# Patient Record
Sex: FEMALE | Race: WHITE | NOT HISPANIC OR LATINO
[De-identification: names, ages, dates, MRNs, and addresses within clinical notes are randomized per-mention and may not be internally consistent; named-entity substitution may affect disease eponyms.]

---

## 2017-05-24 DIAGNOSIS — R79.89 LOW VITAMIN D LEVEL: ICD-10-CM

## 2017-05-24 PROCEDURE — 82306 VITAMIN D 25 HYDROXY: CPT | Performed by: FAMILY MEDICINE

## 2017-05-24 PROCEDURE — 36415 COLL VENOUS BLD VENIPUNCTURE: CPT | Performed by: FAMILY MEDICINE

## 2017-05-25 LAB — DEPRECATED CALCIDIOL+CALCIFEROL SERPL-MC: 37 UG/L (ref 20–75)

## 2019-11-08 ENCOUNTER — HEALTH MAINTENANCE LETTER (OUTPATIENT)
Age: 44
End: 2019-11-08

## 2020-12-06 ENCOUNTER — HEALTH MAINTENANCE LETTER (OUTPATIENT)
Age: 45
End: 2020-12-06

## 2021-03-08 ENCOUNTER — IMMUNIZATION (OUTPATIENT)
Dept: NURSING | Facility: CLINIC | Age: 46
End: 2021-03-08
Payer: COMMERCIAL

## 2021-03-08 PROCEDURE — 0031A PR COVID VAC JANSSEN AD26 0.5ML: CPT

## 2021-03-08 PROCEDURE — 91303 PR COVID VAC JANSSEN AD26 0.5ML: CPT

## 2021-07-31 ENCOUNTER — HEALTH MAINTENANCE LETTER (OUTPATIENT)
Age: 46
End: 2021-07-31

## 2021-09-25 ENCOUNTER — HEALTH MAINTENANCE LETTER (OUTPATIENT)
Age: 46
End: 2021-09-25

## 2022-01-15 ENCOUNTER — HEALTH MAINTENANCE LETTER (OUTPATIENT)
Age: 47
End: 2022-01-15

## 2022-04-20 ASSESSMENT — ANXIETY QUESTIONNAIRES
7. FEELING AFRAID AS IF SOMETHING AWFUL MIGHT HAPPEN: NOT AT ALL
7. FEELING AFRAID AS IF SOMETHING AWFUL MIGHT HAPPEN: NOT AT ALL
GAD7 TOTAL SCORE: 4
1. FEELING NERVOUS, ANXIOUS, OR ON EDGE: SEVERAL DAYS
3. WORRYING TOO MUCH ABOUT DIFFERENT THINGS: SEVERAL DAYS
2. NOT BEING ABLE TO STOP OR CONTROL WORRYING: SEVERAL DAYS
GAD7 TOTAL SCORE: 4
5. BEING SO RESTLESS THAT IT IS HARD TO SIT STILL: NOT AT ALL
4. TROUBLE RELAXING: NOT AT ALL
6. BECOMING EASILY ANNOYED OR IRRITABLE: SEVERAL DAYS

## 2022-04-20 ASSESSMENT — ENCOUNTER SYMPTOMS
POLYPHAGIA: 0
DEPRESSION: 1
BREAST PAIN: 1
DECREASED CONCENTRATION: 0
ALTERED TEMPERATURE REGULATION: 1
INCREASED ENERGY: 1
FEVER: 0
WEIGHT LOSS: 0
DECREASED APPETITE: 0
CHILLS: 1
HOT FLASHES: 1
NERVOUS/ANXIOUS: 1
POLYDIPSIA: 0
HALLUCINATIONS: 0
PANIC: 0
FATIGUE: 1
NIGHT SWEATS: 1
DECREASED LIBIDO: 0
INSOMNIA: 0
BREAST MASS: 1
WEIGHT GAIN: 1

## 2022-04-21 ENCOUNTER — LAB (OUTPATIENT)
Dept: LAB | Facility: CLINIC | Age: 47
End: 2022-04-21
Attending: OBSTETRICS & GYNECOLOGY
Payer: COMMERCIAL

## 2022-04-21 ENCOUNTER — OFFICE VISIT (OUTPATIENT)
Dept: OBGYN | Facility: CLINIC | Age: 47
End: 2022-04-21
Attending: OBSTETRICS & GYNECOLOGY
Payer: COMMERCIAL

## 2022-04-21 VITALS
HEART RATE: 84 BPM | SYSTOLIC BLOOD PRESSURE: 112 MMHG | HEIGHT: 63 IN | BODY MASS INDEX: 25.87 KG/M2 | DIASTOLIC BLOOD PRESSURE: 79 MMHG | WEIGHT: 146 LBS

## 2022-04-21 DIAGNOSIS — Z13.1 SCREENING FOR DIABETES MELLITUS: ICD-10-CM

## 2022-04-21 DIAGNOSIS — Z13.21 ENCOUNTER FOR VITAMIN DEFICIENCY SCREENING: ICD-10-CM

## 2022-04-21 DIAGNOSIS — Z13.220 SCREENING FOR LIPOID DISORDERS: ICD-10-CM

## 2022-04-21 DIAGNOSIS — Z12.11 SCREEN FOR COLON CANCER: ICD-10-CM

## 2022-04-21 DIAGNOSIS — Z01.419 ENCOUNTER FOR GYNECOLOGICAL EXAMINATION WITHOUT ABNORMAL FINDING: ICD-10-CM

## 2022-04-21 DIAGNOSIS — R79.89 LOW VITAMIN D LEVEL: ICD-10-CM

## 2022-04-21 DIAGNOSIS — Z13.29 SCREENING FOR THYROID DISORDER: ICD-10-CM

## 2022-04-21 DIAGNOSIS — Z12.31 ENCOUNTER FOR SCREENING MAMMOGRAM FOR MALIGNANT NEOPLASM OF BREAST: ICD-10-CM

## 2022-04-21 DIAGNOSIS — N95.1 MENOPAUSAL SYNDROME (HOT FLASHES): ICD-10-CM

## 2022-04-21 DIAGNOSIS — Z00.00 VISIT FOR PREVENTIVE HEALTH EXAMINATION: Primary | ICD-10-CM

## 2022-04-21 DIAGNOSIS — Z12.4 SCREENING FOR MALIGNANT NEOPLASM OF CERVIX: ICD-10-CM

## 2022-04-21 LAB
CHOLEST SERPL-MCNC: 160 MG/DL
DEPRECATED CALCIDIOL+CALCIFEROL SERPL-MC: 33 UG/L (ref 20–75)
FASTING STATUS PATIENT QL REPORTED: NO
HBA1C MFR BLD: 4.8 % (ref 0–5.6)
HDLC SERPL-MCNC: 64 MG/DL
LDLC SERPL CALC-MCNC: 65 MG/DL
NONHDLC SERPL-MCNC: 96 MG/DL
TRIGL SERPL-MCNC: 154 MG/DL

## 2022-04-21 PROCEDURE — 82306 VITAMIN D 25 HYDROXY: CPT

## 2022-04-21 PROCEDURE — 90715 TDAP VACCINE 7 YRS/> IM: CPT

## 2022-04-21 PROCEDURE — 36415 COLL VENOUS BLD VENIPUNCTURE: CPT

## 2022-04-21 PROCEDURE — 250N000011 HC RX IP 250 OP 636

## 2022-04-21 PROCEDURE — 83036 HEMOGLOBIN GLYCOSYLATED A1C: CPT

## 2022-04-21 PROCEDURE — 99386 PREV VISIT NEW AGE 40-64: CPT | Performed by: OBSTETRICS & GYNECOLOGY

## 2022-04-21 PROCEDURE — G0145 SCR C/V CYTO,THINLAYER,RESCR: HCPCS | Performed by: OBSTETRICS & GYNECOLOGY

## 2022-04-21 PROCEDURE — 99213 OFFICE O/P EST LOW 20 MIN: CPT | Mod: 25 | Performed by: OBSTETRICS & GYNECOLOGY

## 2022-04-21 PROCEDURE — 80061 LIPID PANEL: CPT

## 2022-04-21 PROCEDURE — 90471 IMMUNIZATION ADMIN: CPT

## 2022-04-21 PROCEDURE — 87624 HPV HI-RISK TYP POOLED RSLT: CPT | Performed by: OBSTETRICS & GYNECOLOGY

## 2022-04-21 ASSESSMENT — ENCOUNTER SYMPTOMS
NECK MASS: 0
BRUISES/BLEEDS EASILY: 0
WEIGHT LOSS: 0
NERVOUS/ANXIOUS: 1
SEIZURES: 0
HEMATURIA: 0
POLYPHAGIA: 0
BOWEL INCONTINENCE: 0
MUSCLE WEAKNESS: 0
POSTURAL DYSPNEA: 0
BREAST PAIN: 1
COUGH DISTURBING SLEEP: 0
NECK PAIN: 0
FLANK PAIN: 0
SINUS PAIN: 0
MEMORY LOSS: 0
DEPRESSION: 1
ALTERED TEMPERATURE REGULATION: 1
LOSS OF CONSCIOUSNESS: 0
HALLUCINATIONS: 0
RECTAL PAIN: 0
RESPIRATORY PAIN: 0
CHILLS: 1
HOARSE VOICE: 0
ORTHOPNEA: 0
DIARRHEA: 0
BLOOD IN STOOL: 0
HEARTBURN: 0
SWOLLEN GLANDS: 0
SPUTUM PRODUCTION: 0
TROUBLE SWALLOWING: 0
DYSURIA: 0
JOINT SWELLING: 0
TASTE DISTURBANCE: 0
HEMOPTYSIS: 0
TACHYCARDIA: 0
POOR WOUND HEALING: 0
EYE REDNESS: 0
CLAUDICATION: 0
INCREASED ENERGY: 1
NAUSEA: 0
SYNCOPE: 0
DISTURBANCES IN COORDINATION: 0
COUGH: 0
STIFFNESS: 0
RECTAL BLEEDING: 0
DOUBLE VISION: 0
BLOATING: 0
DIZZINESS: 0
FATIGUE: 1
POLYDIPSIA: 0
CONSTIPATION: 0
FEVER: 0
DECREASED LIBIDO: 0
NAIL CHANGES: 0
LIGHT-HEADEDNESS: 0
EYE IRRITATION: 0
TREMORS: 0
WEAKNESS: 0
SINUS CONGESTION: 0
DIFFICULTY URINATING: 0
NUMBNESS: 0
MUSCLE CRAMPS: 0
ABDOMINAL PAIN: 0
SKIN CHANGES: 0
EXERCISE INTOLERANCE: 0
SNORES LOUDLY: 0
PANIC: 0
HYPERTENSION: 0
TINGLING: 0
DECREASED APPETITE: 0
SPEECH CHANGE: 0
MYALGIAS: 0
EYE PAIN: 0
DYSPNEA ON EXERTION: 0
SHORTNESS OF BREATH: 0
EXTREMITY NUMBNESS: 0
HYPOTENSION: 0
PALPITATIONS: 0
DECREASED CONCENTRATION: 0
BACK PAIN: 0
SORE THROAT: 0
INSOMNIA: 0
SLEEP DISTURBANCES DUE TO BREATHING: 0
WHEEZING: 0
VOMITING: 0
JAUNDICE: 0
SMELL DISTURBANCE: 0
PARALYSIS: 0
EYE WATERING: 0
HOT FLASHES: 1
BREAST MASS: 1
HEADACHES: 0
LEG SWELLING: 0
ARTHRALGIAS: 0
NIGHT SWEATS: 1
LEG PAIN: 0
WEIGHT GAIN: 1

## 2022-04-21 ASSESSMENT — ANXIETY QUESTIONNAIRES: GAD7 TOTAL SCORE: 4

## 2022-04-21 NOTE — PROGRESS NOTES
"  Progress Note    SUBJECTIVE:  Jose Maria Miles is an 46 year old, , who requests an Annual Preventive Exam.     Concerns today include: having more hormonal changes and she feels like more frequent \"ups and downs.\" She feels tired, irritated and weepy all the time. Last period had really bad cramps on . She does have some concerns that this might be caused by vitamin D deficiency. She does report that at baseline she has always had problems with periods with moodiness starting about 3 days before period starts. She does acupuncture but it has not been helping as much. Periods are shorter cycles now every 25 days. Before it was every 30 days. Still regularly having periods. Amount of bleedings still the same. Duration about 4-5 days so shorter than before. Problems have been going on for about 3 months. She is sleeping okay. Usually would sleep 8 hours. Is being woken up by bad dreams which occurs half the days of the week. No problem falling back asleep. No naps during the day. Takes a nap once a week an feels more energized. No recent stressors in life. Regarding her night sweats - they have been occurring more often now. Previously would have night sweats one night around her period but now it is more.    On ROS, pt endorses - chills, weight gain, fatigue, night sweats, menstrual changes, hot flashes, depression, mood swings, altered temperature regulation. She also says that she has a breast mass which waxes and wanes with her menstrual cycle.      Answers for HPI/ROS submitted by the patient on 2022  SAMANTHA 7 TOTAL SCORE: 4    Menstrual History:  Menstrual History 2016   LAST MENSTRUAL PERIOD 2016 - 2022   Menarche Age - 12 -   Period Cycle (Days) - every 30 days -   Period Duration (Days) - 5-7 days -   Method of Contraception - Condoms -   Period Pattern - Regular -   Menstrual Flow - Moderate -   Menstrual Control - Tampon -   Dysmenorrhea - None - "   Reviewed Today - Yes -       Last    Lab Results   Component Value Date    PAP NIL 2016     History of abnormal Pap smear: NO - age 30- 65 PAP every 3 years recommended    Last   Lab Results   Component Value Date    HPV16 Negative 2016     Last   Lab Results   Component Value Date    HPV18 Negative 2016     Last   Lab Results   Component Value Date    HRHPV Negative 2016       Mammogram current: not applicable  Last Mammogram: Never had a mamogram  No results found.     Last Colonoscopy: none  No results found for this or any previous visit.      HISTORY:  No current outpatient medications on file prior to visit.  No current facility-administered medications on file prior to visit.    Allergies   Allergen Reactions     Naproxen Nausea and Vomiting     Immunization History   Administered Date(s) Administered     COVID-19,PF,Kj 2021     COVID-19,PF,Moderna 2021     Influenza Vaccine IM > 6 months Valent IIV4 (Alfuria,Fluzone) 2016       OB History    Para Term  AB Living   1 1 1 0 0 1   SAB IAB Ectopic Multiple Live Births   0 0 0 0 1   Obstetric Comments   Son with Downs Syndrome     Past Medical History:   Diagnosis Date     Facial basal cell cancer      Infertility, female     Took 4 years to become pregnant.     Melanoma of face (H)     excision     Past Surgical History:   Procedure Laterality Date     BIOPSY      Many over time.     COSMETIC SURGERY      Repair for basal cell and melanoma on my face.     melanoma  2013    left cheek     MOHS MICROGRAPHIC PROCEDURE      multiple basal cell      Family History   Problem Relation Age of Onset     Other Cancer Maternal Grandfather         Lung Cancer - Heavy smoker     Other Cancer Maternal Aunt         lung ca (smoker)     Other Cancer Maternal Grandmother         throat ca (smoker)     Coronary Artery Disease Father         Bi-pass surgery     Hypertension Mother         High blood pressure,  recently hospitalized     Other Cancer Mother         melanoma, basal cell skin cancer     Coronary Artery Disease Paternal Grandfather 50         of MI     Lung Cancer Maternal Aunt      Osteoporosis Paternal Grandmother      Anxiety Disorder Sister      Mental Illness Other         Aunt, severe mental illness     Breast Cancer No family hx of         father's cousin, no ovarian cancer     Diabetes No family hx of      Hyperlipidemia No family hx of      Cerebrovascular Disease No family hx of      Colon Cancer No family hx of      Prostate Cancer No family hx of      Social History     Socioeconomic History     Marital status:      Spouse name: None     Number of children: None     Years of education: None     Highest education level: None   Tobacco Use     Smoking status: Never Smoker     Smokeless tobacco: Never Used   Substance and Sexual Activity     Alcohol use: Yes     Comment: 1-2 drinks per week     Drug use: No     Sexual activity: Yes     Partners: Male     Birth control/protection: Condom       Review of Systems     Constitutional:  Positive for chills, weight gain, fatigue, night sweats and increased energy. Negative for fever, weight loss, decreased appetite, recent stressors, height loss, post-operative complications, incisional pain, hallucinations, hyperactivity and confused.   HENT:  Negative for ear pain, hearing loss, tinnitus, nosebleeds, trouble swallowing, hoarse voice, mouth sores, sore throat, ear discharge, tooth pain, gum tenderness, taste disturbance, smell disturbance, hearing aid, bleeding gums, dry mouth, sinus pain, sinus congestion and neck mass.    Eyes:  Negative for double vision, pain, redness, eye pain, decreased vision, eye watering, eye bulging, eye dryness, flashing lights, spots, floaters, strabismus, tunnel vision, jaundice and eye irritation.   Respiratory:   Negative for cough, hemoptysis, sputum production, shortness of breath, wheezing, sleep disturbances  due to breathing, snores loudly, respiratory pain, dyspnea on exertion, cough disturbing sleep and postural dyspnea.    Cardiovascular:  Negative for chest pain, dyspnea on exertion, palpitations, orthopnea, claudication, leg swelling, fingers/toes turn blue, hypertension, hypotension, syncope, history of heart murmur, chest pain on exertion, chest pain at rest, pacemaker, few scattered varicosities, leg pain, sleep disturbances due to breathing, tachycardia, light-headedness, exercise intolerance and edema.   Gastrointestinal:  Negative for heartburn, nausea, vomiting, abdominal pain, diarrhea, constipation, blood in stool, melena, rectal pain, bloating, hemorrhoids, bowel incontinence, jaundice, rectal bleeding, coffee ground emesis and change in stool.   Genitourinary:  Positive for excessive menstruation, menstrual changes and hot flashes. Negative for bladder incontinence, dysuria, urgency, hematuria, flank pain, vaginal discharge, difficulty urinating, genital sores, dyspareunia, decreased libido, nocturia, voiding less frequently, arousal difficulty, abnormal vaginal bleeding, vaginal dryness and postmenopausal bleeding.   Musculoskeletal:  Negative for myalgias, back pain, joint swelling, arthralgias, stiffness, muscle cramps, neck pain, bone pain, muscle weakness and fracture.   Skin:  Negative for nail changes, itching, poor wound healing, rash, hair changes, skin changes, acne, warts, poor wound healing, scarring, flaky skin, Raynaud's phenomenon, sensitivity to sunlight and skin thickening.   Neurological:  Negative for dizziness, tingling, tremors, speech change, seizures, loss of consciousness, weakness, light-headedness, numbness, headaches, disturbances in coordination, extremity numbness, memory loss, difficulty walking and paralysis.   Endo/Heme:  Negative for anemia, swollen glands and bruises/bleeds easily.   Psychiatric/Behavioral:  Positive for depression and mood swings. Negative for  "hallucinations, memory loss, decreased concentration and panic attacks.    Breast:  Positive for breast mass and breast pain. Negative for breast discharge and nipple retraction.   Endocrine:  Positive for altered temperature regulation.Negative for polyphagia, polydipsia, unwanted hair growth and change in facial hair.    No flowsheet data found.  SAMANTHA-7 SCORE 12/1/2016 4/20/2022   Total Score 2 (minimal anxiety) 4 (minimal anxiety)   Total Score - 4         EXAM:  Blood pressure 112/79, pulse 84, height 1.588 m (5' 2.5\"), weight 66.2 kg (146 lb), last menstrual period 04/06/2022, not currently breastfeeding. Body mass index is 26.28 kg/m .  General - pleasant female in no acute distress.  Skin - no suspicious lesions or rashes  EENT-  PERRLA, euthyroid with out palpable nodules  Neck - supple without lymphadenopathy.  Lungs - clear to auscultation bilaterally.  Heart - regular rate and rhythm without murmur.  Abdomen - soft, nontender, nondistended, no masses or organomegaly noted.  Musculoskeletal - no gross deformities.  Neurological - normal strength, sensation, and mental status.    Breast Exam:  Breast: Without visible skin changes. No dimpling or lesions seen.   Breasts supple, non-tender with palpation, no dominant mass, nodularity, or nipple discharge noted bilaterally. Axillary nodes negative.  Patient also unable to palpate any masses today.    Pelvic Exam:  EG/BUS: Normal genital architecture without lesions, erythema or abnormal secretions Bartholin's, Urethra, Omar's normal   Urethral meatus: normal  Urethra: no masses, tenderness, or scarring  Bladder: no masses or tenderness  Vagina: moist, pink, rugae with physiologic discharge  secretions  Cervix: Multiparous,  Uterus: anteverted,  and small, smooth, firm, mobile w/o pain  Adnexa: Within normal limits and No masses, nodularity, tenderness  Rectum:anus normal       ASSESSMENT:  Encounter Diagnoses   Name Primary?     Visit for preventive health " examination Yes     Low vitamin D level      Screening for diabetes mellitus      Screening for lipoid disorders      Encounter for vitamin deficiency screening      Encounter for screening mammogram for malignant neoplasm of breast      Screening for malignant neoplasm of cervix      Encounter for gynecological examination without abnormal finding      Screen for colon cancer      Menopausal syndrome (hot flashes)      Screening for thyroid disorder       Jose Maria is a 45 y/o  with a history of melanoma, PMS, primary infertility, dysmenorrhea who presents with 3-6 months of night sweats, fatigue, weepiness, irritability. Differential includes perimenopause vs depression.    PLAN:   Orders Placed This Encounter   Procedures     Pelvic and Breast Exam Procedure []     Pap Smear Exam [] Do Not Remove     Mammogram Screening Bilateral Digital [LOC999]     TDAP VACCINE (Adacel, Boostrix)  [[id number]]     Hemoglobin A1c [LAB90]     Lipid panel reflex to direct LDL Non-fasting     25- [place]-Vitamin D     TSH with free T4 reflex     HPV High Risk Types DNA Cervical     Adult Gastro Ref - Procedure Only     No orders of the defined types were placed in this encounter.    #Perimenopause  - TSH, estradiol levels pending  - referral to therapist  - f/u with telemedicine visit to discuss labs and   - offered option to start SSRIs  labs return abnormal    #Preventative Screening  - Lipids panel, A1C, Vitamin D levels pending  - mammogram referral placed  - colonoscopy referral placed  - PAP smear obtained    Additional teaching done at this visit regarding PMS, perimenopause, mental health and weight/diet.    Set up telemedicine visit to discuss lab results once they are back and further actions. Otherwise, return to clinic in one year.  Follow-up as needed.    Rigoberto Hdez, MS3      I, Jessica Laurent, was present with the medical student who participated in the service and in the documentation of the note. I have  verified the history and personally performed the physical exam and medical decision making. I agree with the assessment and plan of care as documented in the note.     Jessica Laurent MD

## 2022-04-21 NOTE — LETTER
Date:May 2, 2022      Provider requested that no letter be sent. Do not send.       [facility]

## 2022-04-21 NOTE — LETTER
"2022       RE: Jose Maria Miles  [address] [place] [address]     Dear Colleague,    Thank you for referring your patient, Jose Maria Miles, to the [facility] [place] at [facility]. Please see a copy of my visit note below.    Chief Complaint   Patient presents with     Physical     Annual exam   Evelyne Diane LPN  Answers for HPI/ROS submitted by the patient on 2022  SAMANTHA 7 TOTAL SCORE: 4  General Symptoms: Yes  Skin Symptoms: No  HENT Symptoms: No  EYE SYMPTOMS: No  HEART SYMPTOMS: No  LUNG SYMPTOMS: No  INTESTINAL SYMPTOMS: No  URINARY SYMPTOMS: No  GYNECOLOGIC SYMPTOMS: Yes  BREAST SYMPTOMS: Yes  SKELETAL SYMPTOMS: No  BLOOD SYMPTOMS: No  NERVOUS SYSTEM SYMPTOMS: No  MENTAL HEALTH SYMPTOMS: Yes  Fever: No  Loss of appetite: No  Weight loss: No  Weight gain: Yes  Fatigue: Yes  Night sweats: Yes  Chills: Yes  Increased stress: No  Excessive hunger: No  Excessive thirst: No  Feeling hot or cold when others believe the temperature is normal: Yes  Loss of height: No  Post-operative complications: No  Surgical site pain: No  Hallucinations: No  Change in or Loss of Energy: Yes  Hyperactivity: No  Confusion: No  Bleeding or spotting between periods: No  Heavy or painful periods: Yes  Irregular periods: Yes  Vaginal discharge: No  Hot flashes: Yes  Vaginal dryness: No  Genital ulcers: No  Reduced libido: No  Painful intercourse: No  Difficulty with sexual arousal: No  Post-menopausal bleeding: No  Discharge: No  Lumps: Yes  Pain: Yes  Nipple retraction: No  Nervous or Anxious: Yes  Depression: Yes  Trouble sleeping: No  Trouble thinking or concentrating: No  Mood changes: Yes  Panic attacks: No          Progress Note    SUBJECTIVE:  Jose Maria Miles is an 46 year old, , who requests an Annual Preventive Exam.     Concerns today include: having more hormonal changes and she feels like more frequent \"ups and " "downs.\" She feels tired, irritated and weepy all the time. Last period had really bad cramps on 4/06. She does have some concerns that this might be caused by vitamin D deficiency. She does report that at baseline she has always had problems with periods with moodiness starting about 3 days before period starts. She does acupuncture but it has not been helping as much. Periods are shorter cycles now every 25 days. Before it was every 30 days. Still regularly having periods. Amount of bleedings still the same. Duration about 4-5 days so shorter than before. Problems have been going on for about 3 months. She is sleeping okay. Usually would sleep 8 hours. Is being woken up by bad dreams which occurs half the days of the week. No problem falling back asleep. No naps during the day. Takes a nap once a week an feels more energized. No recent stressors in life. Regarding her night sweats - they have been occurring more often now. Previously would have night sweats one night around her period but now it is more.    On ROS, pt endorses - chills, weight gain, fatigue, night sweats, menstrual changes, hot flashes, depression, mood swings, altered temperature regulation. She also says that she has a breast mass which waxes and wanes with her menstrual cycle.      Answers for HPI/ROS submitted by the patient on 4/20/2022  SAMANTHA 7 TOTAL SCORE: 4    Menstrual History:  Menstrual History 12/1/2016 12/1/2016 4/21/2022   LAST MENSTRUAL PERIOD 11/17/2016 - 4/6/2022   Menarche Age - 12 -   Period Cycle (Days) - every 30 days -   Period Duration (Days) - 5-7 days -   Method of Contraception - Condoms -   Period Pattern - Regular -   Menstrual Flow - Moderate -   Menstrual Control - Tampon -   Dysmenorrhea - None -   Reviewed Today - Yes -       Last    Lab Results   Component Value Date    PAP NIL 12/01/2016     History of abnormal Pap smear: NO - age 30- 65 PAP every 3 years recommended    Last   Lab Results   Component Value Date    " HPV16 Negative 2016     Last   Lab Results   Component Value Date    HPV18 Negative 2016     Last   Lab Results   Component Value Date    HRHPV Negative 2016       Mammogram current: not applicable  Last Mammogram: Never had a mamogram  No results found.     Last Colonoscopy: none  No results found for this or any previous visit.      HISTORY:  No current outpatient medications on file prior to visit.  No current facility-administered medications on file prior to visit.    Allergies   Allergen Reactions     Naproxen Nausea and Vomiting     Immunization History   Administered Date(s) Administered     COVID-19,PF,Kj 2021     COVID-19,PF,Moderna 2021     Influenza Vaccine IM > 6 months Valent IIV4 (Alfuria,Fluzone) 2016       OB History    Para Term  AB Living   1 1 1 0 0 1   SAB IAB Ectopic Multiple Live Births   0 0 0 0 1   Obstetric Comments   Son with Downs Syndrome     Past Medical History:   Diagnosis Date     Facial basal cell cancer      Infertility, female     Took 4 years to become pregnant.     Melanoma of face (H)     excision     Past Surgical History:   Procedure Laterality Date     BIOPSY      Many over time.     COSMETIC SURGERY      Repair for basal cell and melanoma on my face.     melanoma  2013    left cheek     MOHS MICROGRAPHIC PROCEDURE      multiple basal cell      Family History   Problem Relation Age of Onset     Other Cancer Maternal Grandfather         Lung Cancer - Heavy smoker     Other Cancer Maternal Aunt         lung ca (smoker)     Other Cancer Maternal Grandmother         throat ca (smoker)     Coronary Artery Disease Father         Bi-pass surgery     Hypertension Mother         High blood pressure, recently hospitalized     Other Cancer Mother         melanoma, basal cell skin cancer     Coronary Artery Disease Paternal Grandfather 50         of MI     Lung Cancer Maternal Aunt      Osteoporosis Paternal  Grandmother      Anxiety Disorder Sister      Mental Illness Other         Aunt, severe mental illness     Breast Cancer No family hx of         father's cousin, no ovarian cancer     Diabetes No family hx of      Hyperlipidemia No family hx of      Cerebrovascular Disease No family hx of      Colon Cancer No family hx of      Prostate Cancer No family hx of      Social History     Socioeconomic History     Marital status:      Spouse name: None     Number of children: None     Years of education: None     Highest education level: None   Tobacco Use     Smoking status: Never Smoker     Smokeless tobacco: Never Used   Substance and Sexual Activity     Alcohol use: Yes     Comment: 1-2 drinks per week     Drug use: No     Sexual activity: Yes     Partners: Male     Birth control/protection: Condom       Review of Systems     Constitutional:  Positive for chills, weight gain, fatigue, night sweats and increased energy. Negative for fever, weight loss, decreased appetite, recent stressors, height loss, post-operative complications, incisional pain, hallucinations, hyperactivity and confused.   HENT:  Negative for ear pain, hearing loss, tinnitus, nosebleeds, trouble swallowing, hoarse voice, mouth sores, sore throat, ear discharge, tooth pain, gum tenderness, taste disturbance, smell disturbance, hearing aid, bleeding gums, dry mouth, sinus pain, sinus congestion and neck mass.    Eyes:  Negative for double vision, pain, redness, eye pain, decreased vision, eye watering, eye bulging, eye dryness, flashing lights, spots, floaters, strabismus, tunnel vision, jaundice and eye irritation.   Respiratory:   Negative for cough, hemoptysis, sputum production, shortness of breath, wheezing, sleep disturbances due to breathing, snores loudly, respiratory pain, dyspnea on exertion, cough disturbing sleep and postural dyspnea.    Cardiovascular:  Negative for chest pain, dyspnea on exertion, palpitations, orthopnea,  claudication, leg swelling, fingers/toes turn blue, hypertension, hypotension, syncope, history of heart murmur, chest pain on exertion, chest pain at rest, pacemaker, few scattered varicosities, leg pain, sleep disturbances due to breathing, tachycardia, light-headedness, exercise intolerance and edema.   Gastrointestinal:  Negative for heartburn, nausea, vomiting, abdominal pain, diarrhea, constipation, blood in stool, melena, rectal pain, bloating, hemorrhoids, bowel incontinence, jaundice, rectal bleeding, coffee ground emesis and change in stool.   Genitourinary:  Positive for excessive menstruation, menstrual changes and hot flashes. Negative for bladder incontinence, dysuria, urgency, hematuria, flank pain, vaginal discharge, difficulty urinating, genital sores, dyspareunia, decreased libido, nocturia, voiding less frequently, arousal difficulty, abnormal vaginal bleeding, vaginal dryness and postmenopausal bleeding.   Musculoskeletal:  Negative for myalgias, back pain, joint swelling, arthralgias, stiffness, muscle cramps, neck pain, bone pain, muscle weakness and fracture.   Skin:  Negative for nail changes, itching, poor wound healing, rash, hair changes, skin changes, acne, warts, poor wound healing, scarring, flaky skin, Raynaud's phenomenon, sensitivity to sunlight and skin thickening.   Neurological:  Negative for dizziness, tingling, tremors, speech change, seizures, loss of consciousness, weakness, light-headedness, numbness, headaches, disturbances in coordination, extremity numbness, memory loss, difficulty walking and paralysis.   Endo/Heme:  Negative for anemia, swollen glands and bruises/bleeds easily.   Psychiatric/Behavioral:  Positive for depression and mood swings. Negative for hallucinations, memory loss, decreased concentration and panic attacks.    Breast:  Positive for breast mass and breast pain. Negative for breast discharge and nipple retraction.   Endocrine:  Positive for altered  "temperature regulation.Negative for polyphagia, polydipsia, unwanted hair growth and change in facial hair.    No flowsheet data found.  SAMANTHA-7 SCORE 12/1/2016 4/20/2022   Total Score 2 (minimal anxiety) 4 (minimal anxiety)   Total Score - 4         EXAM:  Blood pressure 112/79, pulse 84, height 1.588 m (5' 2.5\"), weight 66.2 kg (146 lb), last menstrual period 04/06/2022, not currently breastfeeding. Body mass index is 26.28 kg/m .  General - pleasant female in no acute distress.  Skin - no suspicious lesions or rashes  EENT-  PERRLA, euthyroid with out palpable nodules  Neck - supple without lymphadenopathy.  Lungs - clear to auscultation bilaterally.  Heart - regular rate and rhythm without murmur.  Abdomen - soft, nontender, nondistended, no masses or organomegaly noted.  Musculoskeletal - no gross deformities.  Neurological - normal strength, sensation, and mental status.    Breast Exam:  Breast: Without visible skin changes. No dimpling or lesions seen.   Breasts supple, non-tender with palpation, no dominant mass, nodularity, or nipple discharge noted bilaterally. Axillary nodes negative.  Patient also unable to palpate any masses today.    Pelvic Exam:  EG/BUS: Normal genital architecture without lesions, erythema or abnormal secretions Bartholin's, Urethra, [place]'s normal   Urethral meatus: normal  Urethra: no masses, tenderness, or scarring  Bladder: no masses or tenderness  Vagina: moist, pink, rugae with physiologic discharge  secretions  Cervix: Multiparous,  Uterus: anteverted,  and small, smooth, firm, mobile w/o pain  Adnexa: Within normal limits and No masses, nodularity, tenderness  Rectum:anus normal       ASSESSMENT:  Encounter Diagnoses   Name Primary?     Visit for preventive health examination Yes     Low vitamin D level      Screening for diabetes mellitus      Screening for lipoid disorders      Encounter for vitamin deficiency screening      Encounter for screening mammogram for malignant " neoplasm of breast      Screening for malignant neoplasm of cervix      Encounter for gynecological examination without abnormal finding      Screen for colon cancer      Menopausal syndrome (hot flashes)      Screening for thyroid disorder       Jose Maria is a 47 y/o  with a history of melanoma, PMS, primary infertility, dysmenorrhea who presents with 3-6 months of night sweats, fatigue, weepiness, irritability. Differential includes perimenopause vs depression.    PLAN:   Orders Placed This Encounter   Procedures     Pelvic and Breast Exam Procedure []     Pap Smear Exam [] Do Not Remove     Mammogram Screening Bilateral Digital [GOL355]     TDAP VACCINE (Adacel, Boostrix)  [1103068]     Hemoglobin A1c [LAB90]     Lipid panel reflex to direct LDL Non-fasting     25- [place]-Vitamin D     TSH with free T4 reflex     HPV High Risk Types DNA Cervical     Adult Gastro Ref - Procedure Only     No orders of the defined types were placed in this encounter.    #Perimenopause  - TSH, estradiol levels pending  - referral to therapist  - f/u with telemedicine visit to discuss labs and   - offered option to start SSRIs  labs return abnormal    #Preventative Screening  - Lipids panel, A1C, Vitamin D levels pending  - mammogram referral placed  - colonoscopy referral placed  - PAP smear obtained    Additional teaching done at this visit regarding PMS, perimenopause, mental health and weight/diet.    Set up telemedicine visit to discuss lab results once they are back and further actions. Otherwise, return to clinic in one year.  Follow-up as needed.    Rigoberto Hdez, MS3      I, Jessica Laurent, was present with the medical student who participated in the service and in the documentation of the note. I have verified the history and personally performed the physical exam and medical decision making. I agree with the assessment and plan of care as documented in the note.     Jessica Laurent MD        Again, thank you for  allowing me to participate in the care of your patient.      Sincerely,    Jessica Laurent MD

## 2022-04-21 NOTE — PATIENT INSTRUCTIONS

## 2022-04-21 NOTE — PROGRESS NOTES
Chief Complaint   Patient presents with     Physical     Annual exam   Evelyne Diane LPN  Answers for HPI/ROS submitted by the patient on 4/20/2022  SAMANTHA 7 TOTAL SCORE: 4  General Symptoms: Yes  Skin Symptoms: No  HENT Symptoms: No  EYE SYMPTOMS: No  HEART SYMPTOMS: No  LUNG SYMPTOMS: No  INTESTINAL SYMPTOMS: No  URINARY SYMPTOMS: No  GYNECOLOGIC SYMPTOMS: Yes  BREAST SYMPTOMS: Yes  SKELETAL SYMPTOMS: No  BLOOD SYMPTOMS: No  NERVOUS SYSTEM SYMPTOMS: No  MENTAL HEALTH SYMPTOMS: Yes  Fever: No  Loss of appetite: No  Weight loss: No  Weight gain: Yes  Fatigue: Yes  Night sweats: Yes  Chills: Yes  Increased stress: No  Excessive hunger: No  Excessive thirst: No  Feeling hot or cold when others believe the temperature is normal: Yes  Loss of height: No  Post-operative complications: No  Surgical site pain: No  Hallucinations: No  Change in or Loss of Energy: Yes  Hyperactivity: No  Confusion: No  Bleeding or spotting between periods: No  Heavy or painful periods: Yes  Irregular periods: Yes  Vaginal discharge: No  Hot flashes: Yes  Vaginal dryness: No  Genital ulcers: No  Reduced libido: No  Painful intercourse: No  Difficulty with sexual arousal: No  Post-menopausal bleeding: No  Discharge: No  Lumps: Yes  Pain: Yes  Nipple retraction: No  Nervous or Anxious: Yes  Depression: Yes  Trouble sleeping: No  Trouble thinking or concentrating: No  Mood changes: Yes  Panic attacks: No

## 2022-04-25 LAB
BKR LAB AP GYN ADEQUACY: NORMAL
BKR LAB AP GYN INTERPRETATION: NORMAL
BKR LAB AP HPV REFLEX: NORMAL
BKR LAB AP LMP: NORMAL
BKR LAB AP PREVIOUS ABNORMAL: NORMAL
PATH REPORT.COMMENTS IMP SPEC: NORMAL
PATH REPORT.COMMENTS IMP SPEC: NORMAL
PATH REPORT.RELEVANT HX SPEC: NORMAL

## 2022-04-27 LAB
HUMAN PAPILLOMA VIRUS 16 DNA: NEGATIVE
HUMAN PAPILLOMA VIRUS 18 DNA: NEGATIVE
HUMAN PAPILLOMA VIRUS FINAL DIAGNOSIS: NORMAL
HUMAN PAPILLOMA VIRUS OTHER HR: NEGATIVE

## 2022-08-27 ENCOUNTER — HEALTH MAINTENANCE LETTER (OUTPATIENT)
Age: 47
End: 2022-08-27

## 2023-04-22 ENCOUNTER — HEALTH MAINTENANCE LETTER (OUTPATIENT)
Age: 48
End: 2023-04-22

## 2023-06-02 ENCOUNTER — HEALTH MAINTENANCE LETTER (OUTPATIENT)
Age: 48
End: 2023-06-02

## 2023-09-23 ENCOUNTER — HEALTH MAINTENANCE LETTER (OUTPATIENT)
Age: 48
End: 2023-09-23

## 2024-06-29 ENCOUNTER — HEALTH MAINTENANCE LETTER (OUTPATIENT)
Age: 49
End: 2024-06-29

## 2024-11-07 ENCOUNTER — OFFICE VISIT (OUTPATIENT)
Dept: OBGYN | Facility: CLINIC | Age: 49
End: 2024-11-07
Attending: OBSTETRICS & GYNECOLOGY
Payer: COMMERCIAL

## 2024-11-07 ENCOUNTER — ANCILLARY PROCEDURE (OUTPATIENT)
Dept: ULTRASOUND IMAGING | Facility: CLINIC | Age: 49
End: 2024-11-07
Attending: OBSTETRICS & GYNECOLOGY
Payer: COMMERCIAL

## 2024-11-07 VITALS
HEIGHT: 63 IN | WEIGHT: 158.3 LBS | HEART RATE: 66 BPM | DIASTOLIC BLOOD PRESSURE: 78 MMHG | BODY MASS INDEX: 28.05 KG/M2 | SYSTOLIC BLOOD PRESSURE: 113 MMHG

## 2024-11-07 DIAGNOSIS — R93.89 ABNORMAL PELVIC ULTRASOUND: ICD-10-CM

## 2024-11-07 DIAGNOSIS — N95.1 PERIMENOPAUSE: Primary | ICD-10-CM

## 2024-11-07 DIAGNOSIS — R10.2 PELVIC PAIN IN FEMALE: ICD-10-CM

## 2024-11-07 PROBLEM — M79.674 PAIN OF RIGHT GREAT TOE: Status: ACTIVE | Noted: 2024-10-29

## 2024-11-07 PROCEDURE — 76856 US EXAM PELVIC COMPLETE: CPT

## 2024-11-07 PROCEDURE — 76856 US EXAM PELVIC COMPLETE: CPT | Mod: 26 | Performed by: STUDENT IN AN ORGANIZED HEALTH CARE EDUCATION/TRAINING PROGRAM

## 2024-11-07 PROCEDURE — 99213 OFFICE O/P EST LOW 20 MIN: CPT | Performed by: OBSTETRICS & GYNECOLOGY

## 2024-11-07 PROCEDURE — 76830 TRANSVAGINAL US NON-OB: CPT | Mod: 26 | Performed by: STUDENT IN AN ORGANIZED HEALTH CARE EDUCATION/TRAINING PROGRAM

## 2024-11-07 PROCEDURE — 99214 OFFICE O/P EST MOD 30 MIN: CPT | Performed by: OBSTETRICS & GYNECOLOGY

## 2024-11-07 RX ORDER — ESTRADIOL 10 UG/1
10 INSERT VAGINAL
Qty: 24 TABLET | Refills: 3 | Status: SHIPPED | OUTPATIENT
Start: 2024-11-07

## 2024-11-07 RX ORDER — ESTRADIOL 1 MG/1
1 TABLET ORAL DAILY
Qty: 90 TABLET | Refills: 3 | Status: SHIPPED | OUTPATIENT
Start: 2024-11-07

## 2024-11-07 RX ORDER — PROGESTERONE 100 MG/1
CAPSULE ORAL
Qty: 21 CAPSULE | Refills: 3 | Status: SHIPPED | OUTPATIENT
Start: 2024-11-07

## 2024-11-07 NOTE — PROGRESS NOTES
Menopause Consult    Jose Maria Miles is a 49 year old , here today for consultation for evaluation of ongoing perimenopause.      The patient has reported increased weight gain, hot flashes, brain fog, vaginal dryness, and pain with intercourse. Is still having regular menstrual cycles. Was interested in starting HRT. Risks and benefits discussed.    The patient also complains of ongoing LLQ abdominal pain, that is more diffuse during episodes. This pain has been constant, but notices it more during ovulation. She has had a similar sensation before with ovulation, and this use to resolve but is not constant and worse during menstrual cycles. Also notice that the pain is worse when she is constipated, and tends to be more dull when eating a higher fiber diet.    Patient Active Problem List   Diagnosis    History of skin cancer- followed by Dermatology    Family history of glaucoma    History of Mohs micrographic surgery for skin cancer    Hyperopia    Pain of right great toe     Past Medical History:   Diagnosis Date    Facial basal cell cancer     Infertility, female     Took 4 years to become pregnant.    Melanoma of face (H)     excision     Past Surgical History:   Procedure Laterality Date    BIOPSY      Many over time.    COSMETIC SURGERY      Repair for basal cell and melanoma on my face.    melanoma  2013    left cheek    MOHS MICROGRAPHIC PROCEDURE      multiple basal cell      OB History    Para Term  AB Living   1 1 1 0 0 1   SAB IAB Ectopic Multiple Live Births   0 0 0 0 1      # Outcome Date GA Lbr [place]/2nd Weight Sex Type Anes PTL Lv   1 Term 12 39w0d 08:00 2.778 kg (6 lb 2 oz) M Vag-Vacuum  Y LONNIE      Birth Comments: T21, oligo      Name: Augusto      Obstetric Comments   Son with Downs Syndrome     Social History     Tobacco Use    Smoking status: Never    Smokeless tobacco: Never   Substance Use Topics    Alcohol use: Yes     Comment: 1-2 drinks per week    Drug  "use: No     O:    /78   Pulse 66   Ht 1.588 m (5' 2.5\")   Wt 71.8 kg (158 lb 4.8 oz)   LMP 10/23/2024 (Exact Date)   BMI 28.49 kg/m      General: AA X3  Abdominal: LLQ pain to palpation, no rebound tenderness. Abdomen flat. No palpable masses.    Menopause avg age 53 [40-58], natural decline in follicular estrogen production  VMS cluster in 6 months to two years surrounding final menses, and the year after menses but may continue for years; obesity = RF; 60-70% of people experience VMS, 40% being moderate to severe  Avg weight gain for the transition = 5 lbs  90% have 4-8 years of menstrual cycle changes    (N95.1) Perimenopause  (primary encounter diagnosis)  Comment: Age and symptomology consistent with mario-menopause. Risks and benefits discussed. Will start HRT and reassess in a few months.  Plan: estradiol (ESTRACE) 1 MG tablet, progesterone         (PROMETRIUM) 100 MG capsule, estradiol         (VAGIFEM) 10 MCG TABS vaginal tablet           (R10.2) Pelvic pain in female  Comment: Ongoing LLQ abdominal pain, CT scan abdomen pelvis per chart review 9/23 on demonstrated unremarkable uterus and adnexa, but no other information was available.  Plan: US Pelvic Complete with Transvaginal              RTC 2 months    Toni Aguila MS3    I agree with the PFSH and ROS as completed by the MS and the documentation has been edited by me. The remainder of the encounter was performed by me and scribed by the MS. The scribed note accurately reflects my personal services and the decisions made by me.  Moraima Lopez MD       "

## 2024-11-07 NOTE — PATIENT INSTRUCTIONS
Thank you for trusting us with your care!   Please be aware, if you are on Mychart, you may see your results prior to your providers review. If labs are abnormal, we will call or message you on [email or web address] with a follow up plan.    If you need to contact us for questions about:  Symptoms, Scheduling & Medical Questions; Non-urgent (2-3 day response) [employer] message, Urgent (needing response today) [phone] (if after 3:30pm next day response)   Prescriptions: Please call your Pharmacy   Billing: [facility] [phone] or [facility]:[phone]

## 2024-11-07 NOTE — LETTER
2024       RE: Jose Maria Miles  [address] [place] [address]     Dear Colleague,    Thank you for referring your patient, Jose Maria Miles, to the [facility] [place] at [facility]. Please see a copy of my visit note below.    Menopause Consult    Jose Maria Miles is a 49 year old , here today for consultation for evaluation of ongoing perimenopause.      The patient has reported increased weight gain, hot flashes, brain fog, vaginal dryness, and pain with intercourse. Is still having regular menstrual cycles. Was interested in starting HRT. Risks and benefits discussed.    The patient also complains of ongoing LLQ abdominal pain, that is more diffuse during episodes. This pain has been constant, but notices it more during ovulation. She has had a similar sensation before with ovulation, and this use to resolve but is not constant and worse during menstrual cycles. Also notice that the pain is worse when she is constipated, and tends to be more dull when eating a higher fiber diet.    Patient Active Problem List   Diagnosis     History of skin cancer- followed by Dermatology     Family history of glaucoma     History of Mohs micrographic surgery for skin cancer     Hyperopia     Pain of right great toe     Past Medical History:   Diagnosis Date     Facial basal cell cancer      Infertility, female     Took 4 years to become pregnant.     Melanoma of face (H)     excision     Past Surgical History:   Procedure Laterality Date     BIOPSY      Many over time.     COSMETIC SURGERY      Repair for basal cell and melanoma on my face.     melanoma  2013    left cheek     MOHS MICROGRAPHIC PROCEDURE      multiple basal cell      OB History    Para Term  AB Living   1 1 1 0 0 1   SAB IAB Ectopic Multiple Live Births   0 0 0 0 1      # Outcome Date GA Lbr [place]/2nd Weight Sex Type Anes PTL Lv   1  "Term 03/17/12 39w0d 08:00 2.778 kg (6 lb 2 oz) M Vag-Vacuum  Y LONNIE      Birth Comments: T21, oligo      Name: Augusto      Obstetric Comments   Son with Downs Syndrome     Social History     Tobacco Use     Smoking status: Never     Smokeless tobacco: Never   Substance Use Topics     Alcohol use: Yes     Comment: 1-2 drinks per week     Drug use: No     O:    /78   Pulse 66   Ht 1.588 m (5' 2.5\")   Wt 71.8 kg (158 lb 4.8 oz)   LMP 10/23/2024 (Exact Date)   BMI 28.49 kg/m      General: AA X3  Abdominal: LLQ pain to palpation, no rebound tenderness. Abdomen flat. No palpable masses.    Menopause avg age 53 [40-58], natural decline in follicular estrogen production  VMS cluster in 6 months to two years surrounding final menses, and the year after menses but may continue for years; obesity = RF; 60-70% of people experience VMS, 40% being moderate to severe  Avg weight gain for the transition = 5 lbs  90% have 4-8 years of menstrual cycle changes    (N95.1) Perimenopause  (primary encounter diagnosis)  Comment: Age and symptomology consistent with mario-menopause. Risks and benefits discussed. Will start HRT and reassess in a few months.  Plan: estradiol (ESTRACE) 1 MG tablet, progesterone         (PROMETRIUM) 100 MG capsule, estradiol         (VAGIFEM) 10 MCG TABS vaginal tablet           (R10.2) Pelvic pain in female  Comment: Ongoing LLQ abdominal pain, CT scan abdomen pelvis per chart review 9/23 on demonstrated unremarkable uterus and adnexa, but no other information was available.  Plan: US Pelvic Complete with Transvaginal              RTC 2 months    Toni Aguila MS3    I agree with the PFSH and ROS as completed by the MS and the documentation has been edited by me. The remainder of the encounter was performed by me and scribed by the MS. The scribed note accurately reflects my personal services and the decisions made by me.  Moraima Lopez MD           Again, thank you for allowing me to participate " in the care of your patient.      Sincerely,    Moraima Lopez MD

## 2024-11-15 ENCOUNTER — MYC MEDICAL ADVICE (OUTPATIENT)
Dept: OBGYN | Facility: CLINIC | Age: 49
End: 2024-11-15
Payer: COMMERCIAL

## 2024-11-15 DIAGNOSIS — N95.1 PERIMENOPAUSE: Primary | ICD-10-CM

## 2024-11-18 NOTE — TELEPHONE ENCOUNTER
Spoke with patient. Jose Maria is requesting Vaginal estrogen cream instead of vagifem tablets, which are not covered by her insurance. Will send to Dr. Lopez for approval.

## 2024-11-19 ENCOUNTER — TELEPHONE (OUTPATIENT)
Dept: OBGYN | Facility: CLINIC | Age: 49
End: 2024-11-19
Payer: COMMERCIAL

## 2024-11-19 RX ORDER — ESTRADIOL 0.1 MG/G
2 CREAM VAGINAL
Qty: 60 G | Refills: 3 | Status: SHIPPED | OUTPATIENT
Start: 2024-11-21

## 2024-11-19 NOTE — TELEPHONE ENCOUNTER
----- Message from Reema WYNN sent at 11/19/2024  2:17 PM CST -----  Regarding: Follow up with John Sumner can someone call Jose Maria and help her schedule a follow up phone visit with Dr. Lopez for February Thanks    Megan

## 2025-01-16 ENCOUNTER — TELEPHONE (OUTPATIENT)
Dept: OBGYN | Facility: CLINIC | Age: 50
End: 2025-01-16
Payer: COMMERCIAL

## 2025-01-16 NOTE — TELEPHONE ENCOUNTER
Relayed Dr. Lopez's recommendations to patient. OK to send in GAP refill if needed for progesterone if patient or pharmacy call for it. May have to send in new RX with refills .

## 2025-01-16 NOTE — TELEPHONE ENCOUNTER
Patient has 3 refills left on prescription. Let her know. Patient states she took too many pills the first time. Let her know to reach out to pharmacy for a gap prescription, she may have to pay out of pocket.

## 2025-01-16 NOTE — TELEPHONE ENCOUNTER
Spoke with Jose Maria. She has a couple of questions surrounding taking progesterone. Patient has been taking before bed and accidentally took too many pills the first time and has run out early, she also is wondering if she should take it following her period during her monthly cycle. Will ask Dr. Lopez for clarification.

## 2025-01-16 NOTE — TELEPHONE ENCOUNTER
[facility] Call Center    Phone Message    May a detailed message be left on voicemail: yes     Reason for Call: Medication Refill Request    Has the patient contacted the pharmacy for the refill? Yes   Name of medication being requested: progesterone (PROMETRIUM) 100 MG capsule  Provider who prescribed the medication: Moraima Lopez MD  Pharmacy: [address] [address]  Date medication is needed: ASAP - pt is out       Pt is requesting a refill for the medication listed above. Pt is out. Please call with questions or concerns at # [phone].    Action Taken: Message routed to:  Other: [facility]    Travel Screening: Not Applicable     Date of Service:

## 2025-02-21 ENCOUNTER — VIRTUAL VISIT (OUTPATIENT)
Dept: OBGYN | Facility: CLINIC | Age: 50
End: 2025-02-21
Attending: OBSTETRICS & GYNECOLOGY
Payer: COMMERCIAL

## 2025-02-21 DIAGNOSIS — N95.1 PERIMENOPAUSE: ICD-10-CM

## 2025-02-21 RX ORDER — ESTRADIOL 1 MG/1
1 TABLET ORAL DAILY
Qty: 90 TABLET | Refills: 3 | Status: SHIPPED | OUTPATIENT
Start: 2025-02-21

## 2025-02-21 RX ORDER — PROGESTERONE 100 MG/1
CAPSULE ORAL
Qty: 21 CAPSULE | Refills: 3 | Status: SHIPPED | OUTPATIENT
Start: 2025-02-21

## 2025-02-21 NOTE — LETTER
2025       RE: Jose Maria Miles  [address] [place] [address]     Dear Colleague,    Thank you for referring your patient, Jose Maria Miles, to the [facility] [place] at [facility]. Please see a copy of my visit note below.    [facility]  Gynecology Visit  S:    Jose Maria Miles is a 49 year old , here for telephone follow up regarding HRT for perimenopausal symptoms. She saw Dr Lopez on 24 with reports of weight gain, hot flashes, brain fog and vaginal dryness. HRT was initiated with Estradiol 1 mg PO daily, Prometrium 100 mg daily x7 days/month and vaginal estrogen. She reports that her perimenopausal symptoms have significantly improved including vaginal dryness and her skin is looking better. She never started the vaginal estrogen given improvement in vaginal dryness with just oral estrogen. She does continue to struggle with emotional lability in the 2 weeks before her withdrawal bleeds. She does state they are helping care for a family from [facility] who may need to be deported which is heightening her stress however she also feels that the Prometrium is worsening her mood symptoms. She is unsure when she should be taking the Prometrium per month as she received conflicting information about taking it either the week prior to week following her vaginal bleeding. Continues to have 2-3 AM nightly awakenings. Magnesium has been helpful.     O:   No vitals or physical examination performed as this was a telephone visit     A/P:   Jose Maria Miles is a 49 year old  female here for follow up regarding HRT. Refills provided as she is happy with her current dose. Discussed Prometrium. If it is causing significant mood symptoms discussed alternatives such as Mirena IUD vs daily Progesterone 100 mg. She is not interested in either of these options and given that mood symptoms could be situational,  would like to continue with current plan of care. May shift to start taking Prometrium after her next bleed however she may not have a spontaneous bleed without progesterone withdrawal. Most important factor would be reliably taking the Progesterone monthly for endometrial protection.   - Refills provided for Estradiol and Prometrium   - Can consider increasing Prometrium to 200 mg monthly x12 days for endometrial protection or 100 mg daily however patient would like to follow up with Dr Lopez who initially prescribed these medications (plans follow up in 3 months), until then will continue on current regimen   - Discussed Magnesium safe for sleep aid, can also use Melatonin     Staffed with Dr. Lucas De Paz MD  Ob/Gyn PGY-3  02/21/25 4:47 PM        Again, thank you for allowing me to participate in the care of your patient.      Sincerely,    Sil De Paz MD

## 2025-02-21 NOTE — PROGRESS NOTES
[facility]  Gynecology Visit  S:    Jose Maria Miles is a 49 year old , here for telephone follow up regarding HRT for perimenopausal symptoms. She saw Dr Lopez on 24 with reports of weight gain, hot flashes, brain fog and vaginal dryness. HRT was initiated with Estradiol 1 mg PO daily, Prometrium 100 mg daily x7 days/month and vaginal estrogen. She reports that her perimenopausal symptoms have significantly improved including vaginal dryness and her skin is looking better. She never started the vaginal estrogen given improvement in vaginal dryness with just oral estrogen. She does continue to struggle with emotional lability in the 2 weeks before her withdrawal bleeds. She does state they are helping care for a family from [facility] who may need to be deported which is heightening her stress however she also feels that the Prometrium is worsening her mood symptoms. She is unsure when she should be taking the Prometrium per month as she received conflicting information about taking it either the week prior to week following her vaginal bleeding. Continues to have 2-3 AM nightly awakenings. Magnesium has been helpful.     O:   No vitals or physical examination performed as this was a telephone visit     A/P:   Jose Maria Miles is a 49 year old  female here for follow up regarding HRT. Refills provided as she is happy with her current dose. Discussed Prometrium. If it is causing significant mood symptoms discussed alternatives such as Mirena IUD vs daily Progesterone 100 mg. She is not interested in either of these options and given that mood symptoms could be situational, would like to continue with current plan of care. May shift to start taking Prometrium after her next bleed however she may not have a spontaneous bleed without progesterone withdrawal. Most important factor would be reliably taking the Progesterone monthly for endometrial protection.   - Refills provided for Estradiol  and Prometrium   - Can consider increasing Prometrium to 200 mg monthly x12 days for endometrial protection or 100 mg daily however patient would like to follow up with Dr Lopez who initially prescribed these medications (plans follow up in 3 months), until then will continue on current regimen   - Discussed Magnesium safe for sleep aid, can also use Melatonin     Staffed with Dr. Lucsa De Paz MD  Ob/Gyn PGY-3  02/21/25 4:47 PM    Patient was seen by the resident in [facility].  I discussed the patient with the resident during the patient's visit and the patient's assessment and plan were made jointly.      Priscila Zavala MD

## 2025-03-10 ENCOUNTER — TELEPHONE (OUTPATIENT)
Dept: OBGYN | Facility: CLINIC | Age: 50
End: 2025-03-10
Payer: COMMERCIAL

## 2025-03-10 NOTE — TELEPHONE ENCOUNTER
LVM informing patient Dr. Lopez will not be available, offered appointment with another OB doctor instead

## 2025-03-10 NOTE — TELEPHONE ENCOUNTER
----- Message from Kellie WYNN sent at 2/26/2025  8:29 AM CST -----  Per Megan, Patient should be scheduled with another attending, anyone other than Zackery Laurent Ralph or Hussein.  ----- Message -----  From: Sil De Paz MD  Sent: 2/21/2025   4:46 PM CST  To: [facility]    Can the attached patient be scheduled for a phone follow up with Dr Lopez in May to discuss HRT?     Thanks!  Sil

## 2025-05-01 ENCOUNTER — VIRTUAL VISIT (OUTPATIENT)
Dept: OBGYN | Facility: CLINIC | Age: 50
End: 2025-05-01
Attending: OBSTETRICS & GYNECOLOGY
Payer: COMMERCIAL

## 2025-05-01 DIAGNOSIS — N95.1 PERIMENOPAUSE: Primary | ICD-10-CM

## 2025-05-01 NOTE — LETTER
5/1/2025       RE: Jose Maria Miles  [address] [place] [address]     Dear Colleague,    Thank you for referring your patient, Jose Maria Miles, to the [facility] [place] at [facility]. Please see a copy of my visit note below.    50 yo P1 phone visit for HRT followup.   Current regimen is Estradiol 1 mg PO daily and monthly prometrium 100mg. She has not tried E2 vaginal cream yet-- the vaginal tablet was not covered by insurance.   She states the regimen is helping skin and vaginal dryness but she has breast tenderness that doesn't fully resolve after menses and wonders if she needs lower dose. She is also confused about when to take the progesterone. Also, feels more side effects from progesterone.   We reviewed pathophysiology of HRT and progesterone to protect the uterus from hyperplasia. We discussed options for progesterone including compounded options and Mirena IUD.     After discussion and shared decision making patient will try the following:  Stop oral estrogen and progesterone and start vaginal E2 cream.     If this treats her symptoms she may opt to suspend the systemic HRT at this time.   If not, we will revisit her options.   RTC in 2 weeks.      Telemedicine Visit: The patient's condition can be safely assessed and treated in telemedicine encounter.      Reason for Telemedicine Visit: Patient has requested telehealth visit COVID 19    Originating Site (Patient Location): Patient's home    Distant Site (Provider Location): [facility]: [facility]    Consent:  The patient/guardian has verbally consented to: the potential risks and benefits of telemedicine versus in person care; bill my insurance or make self-payment for services provided; and responsibility for payment of non-covered services.     Mode of Communication:  Phone     As the provider I attest to compliance with applicable laws and regulations  related to telemedicine.    Time on call and chart review 13 minutes.     Moraima Lopez MD          Again, thank you for allowing me to participate in the care of your patient.      Sincerely,    Moraima Lopez MD

## 2025-05-01 NOTE — PROGRESS NOTES
50 yo P1 phone visit for HRT followup.   Current regimen is Estradiol 1 mg PO daily and monthly prometrium 100mg. She has not tried E2 vaginal cream yet-- the vaginal tablet was not covered by insurance.   She states the regimen is helping skin and vaginal dryness but she has breast tenderness that doesn't fully resolve after menses and wonders if she needs lower dose. She is also confused about when to take the progesterone. Also, feels more side effects from progesterone.   We reviewed pathophysiology of HRT and progesterone to protect the uterus from hyperplasia. We discussed options for progesterone including compounded options and Mirena IUD.     After discussion and shared decision making patient will try the following:  Stop oral estrogen and progesterone and start vaginal E2 cream.     If this treats her symptoms she may opt to suspend the systemic HRT at this time.   If not, we will revisit her options.   RTC in 2 weeks.      Telemedicine Visit: The patient's condition can be safely assessed and treated in telemedicine encounter.      Reason for Telemedicine Visit: Patient has requested telehealth visit COVID 19    Originating Site (Patient Location): Patient's home    Distant Site (Provider Location): [facility]: [facility]    Consent:  The patient/guardian has verbally consented to: the potential risks and benefits of telemedicine versus in person care; bill my insurance or make self-payment for services provided; and responsibility for payment of non-covered services.     Mode of Communication:  Phone     As the provider I attest to compliance with applicable laws and regulations related to telemedicine.    Time on call and chart review 13 minutes.     Moraima Lopez MD

## 2025-05-29 ENCOUNTER — VIRTUAL VISIT (OUTPATIENT)
Dept: OBGYN | Facility: CLINIC | Age: 50
End: 2025-05-29
Attending: OBSTETRICS & GYNECOLOGY
Payer: COMMERCIAL

## 2025-05-29 DIAGNOSIS — N95.1 PERIMENOPAUSE: Primary | ICD-10-CM

## 2025-05-29 NOTE — PROGRESS NOTES
50 yo P1 phone visit for HRT followup.     Previous regimen is Estradiol 1 mg PO daily and monthly prometrium 100mg. She had not tried E2 vaginal cream yet-- the vaginal tablet was not covered by insurance.   Previously the regimen was helping skin and vaginal dryness but she had breast fullness that didn't resolve and was concerned about the systemic regimen.      Had one withdrawal bleed after stopping systemic estrogen and progesterone    The twice weekly 2g of vaginal cream is helping but was still sore the day after her last attempted IC.      Patient Active Problem List   Diagnosis    History of skin cancer- followed by Dermatology    Family history of glaucoma    History of Mohs micrographic surgery for skin cancer    Hyperopia    Pain of right great toe     Past Medical History:   Diagnosis Date    Facial basal cell cancer     Infertility, female 2009    Took 4 years to become pregnant.    Melanoma of face (H)     excision     O: sounds well on phone    A/P:  Ongoing perimenopause issues mostly treated by acupuncture and chinese herbs  Currently off systemic HRT  For vaginal symptoms recommend: 2g daily or every other day for 1-2 weeks with goal of resuming 2x/week eventually  Call or [facility] appt prn other symptoms  For breast fullness try evening primrose oil and if not resolved see us or primary care         Telemedicine Visit: The patient's condition can be safely assessed and treated in telemedicine encounter.      Reason for Telemedicine Visit: Patient has requested telehealth visit COVID 19    Originating Site (Patient Location): Patient's home    Distant Site (Provider Location): [facility]: [facility]    Consent:  The patient/guardian has verbally consented to: the potential risks and benefits of telemedicine versus in person care; bill my insurance or make self-payment for services provided; and responsibility for payment of non-covered services.     Mode of Communication:  Phone    As the  provider I attest to compliance with applicable laws and regulations related to telemedicine.time spent in chart review and discussion on day of service was 11 minutes.     Moraima Lopez MD

## 2025-05-29 NOTE — LETTER
5/29/2025       RE: Jose Maria Miles  [address] [place] [address]     Dear Colleague,    Thank you for referring your patient, Jose Maria Miles, to the [facility] [place] at [facility]. Please see a copy of my visit note below.    48 yo P1 phone visit for HRT followup.     Previous regimen is Estradiol 1 mg PO daily and monthly prometrium 100mg. She had not tried E2 vaginal cream yet-- the vaginal tablet was not covered by insurance.   Previously the regimen was helping skin and vaginal dryness but she had breast fullness that didn't resolve and was concerned about the systemic regimen.      Had one withdrawal bleed after stopping systemic estrogen and progesterone    The twice weekly 2g of vaginal cream is helping but was still sore the day after her last attempted IC.      Patient Active Problem List   Diagnosis     History of skin cancer- followed by Dermatology     Family history of glaucoma     History of Mohs micrographic surgery for skin cancer     Hyperopia     Pain of right great toe     Past Medical History:   Diagnosis Date     Facial basal cell cancer      Infertility, female 2009    Took 4 years to become pregnant.     Melanoma of face (H)     excision     O: sounds well on phone    A/P:  Ongoing perimenopause issues mostly treated by acupuncture and chinese herbs  Currently off systemic HRT  For vaginal symptoms recommend: 2g daily or every other day for 1-2 weeks with goal of resuming 2x/week eventually  Call or [facility]t prn other symptoms  For breast fullness try evening primrose oil and if not resolved see us or primary care         Telemedicine Visit: The patient's condition can be safely assessed and treated in telemedicine encounter.      Reason for Telemedicine Visit: Patient has requested telehealth visit COVID 19    Originating Site (Patient Location): Patient's home    Distant Site (Provider Location):  [facility]: [facility]    Consent:  The patient/guardian has verbally consented to: the potential risks and benefits of telemedicine versus in person care; bill my insurance or make self-payment for services provided; and responsibility for payment of non-covered services.     Mode of Communication:  Phone    As the provider I attest to compliance with applicable laws and regulations related to telemedicine.time spent in chart review and discussion on day of service was 11 minutes.     Moraima Lopez MD        Again, thank you for allowing me to participate in the care of your patient.      Sincerely,    Moraima Lopez MD

## 2025-07-13 ENCOUNTER — HEALTH MAINTENANCE LETTER (OUTPATIENT)
Age: 50
End: 2025-07-13